# Patient Record
(demographics unavailable — no encounter records)

---

## 2018-03-25 NOTE — NUR
RECEIVED CHANGE OF SHIFT REOIRT FROM TADEO WILSON. PATIENT ALERT AND ORIENTED
AND LYING IN BED. FAMILY AT BEDSIDE. REPORTS PAIN ONLY IN MOVEMENT. WILL
CONTINUE TO MONITOR.

## 2018-03-25 NOTE — NUR
PATIENT TRANSPORTED TO [facility] WITH TELE VIA WHEELCHAIR. BEDSIDE REPORT GIVEN
TO TADEO WILSON. CARE RELINQUISHED.

## 2018-03-25 NOTE — NUR
PT CAME FROM ER VIA WC BY TADEO HUSSEIN. OBTAIN PT WT AND THEN PT WENT TO SIT IN
THE SIDE OF THE BED. ORIENTED PT TO CALL LIGHT AND SAFETY PRECAUTIONS
REINFORCED. FAMILY MEMBER IN ROOM.

## 2018-03-25 NOTE — NUR
PATIENT REPORTS CHEST PAIN LEVEL 2/10 AT THIS TIME. CALL LIGHT WITHIN REACH
WILL CONTINUE TO MONITOR.

## 2018-03-26 NOTE — NUR
PT RECEIVED DISCHARGE INSTRUCTIONS AND VERBALIZED UNDERSTANDING. IV SITE IS
FREE FROM REDNESS OR EDEMA. HAS BEEN DISCONTINEUD CATHETER INTACT. TELE
MONITOR HAS BEEN TAKEN OFF PT. CONTINUE TO OSBERVE AND MONITOR.

## 2018-03-26 NOTE — NUR
PATIENT RESTED WELL DURING THE NIGHT. NO VOICED COMPLAINTS. NO APPARENT ACUTE
CHANGES NOTED IN PT'S CONDITION.

## 2018-03-26 NOTE — NUR
PT IS RELAXING IN BED WITH MO DISTRESS NOTED. IV SITE IS FREE FROM REDNESS OR
EDEMA. CONTINUE TO OSBERE AND MONITOR,

## 2018-03-26 NOTE — NUR
PT HAS BEEN RESTING IN BED WITH NO DISTRESS NOTED. IV  SITE IS FREE FROM
REDNESS OR EDEMA. HR IS REG, PULSES ARE STRONG X4,ABD IS SOFT WITH ACTIVE BS.
TELE MONITOR IN PLACE. CONTINUE TO OSBERVE AND MONITOR

## 2018-03-26 NOTE — NUR
PATIENT RESTING QUIETLY AND APPEARS NOT TO BE IN ANY APPARENT ACUTE DISTRESS
AT THIS TIME. DENIES CHEST PAIN. WILL CONTINUE TO MONITOR.